# Patient Record
Sex: FEMALE | URBAN - METROPOLITAN AREA
[De-identification: names, ages, dates, MRNs, and addresses within clinical notes are randomized per-mention and may not be internally consistent; named-entity substitution may affect disease eponyms.]

---

## 2023-01-10 ENCOUNTER — TELEPHONE (OUTPATIENT)
Dept: ORTHOPEDIC SURGERY | Age: 63
End: 2023-01-10

## 2023-01-10 NOTE — TELEPHONE ENCOUNTER
Left vmail to r/s. Lindsay Heath is out. Please reschedule upon return call/may genia bill/Sushma for same day. Letter mailed.

## 2023-01-19 ENCOUNTER — OFFICE VISIT (OUTPATIENT)
Dept: ORTHOPEDIC SURGERY | Age: 63
End: 2023-01-19
Payer: MEDICAID

## 2023-01-19 VITALS — BODY MASS INDEX: 46.26 KG/M2 | WEIGHT: 245 LBS | HEIGHT: 61 IN

## 2023-01-19 DIAGNOSIS — M25.511 RIGHT SHOULDER PAIN, UNSPECIFIED CHRONICITY: Primary | ICD-10-CM

## 2023-01-19 DIAGNOSIS — M19.011 PRIMARY OSTEOARTHRITIS OF BOTH SHOULDERS: ICD-10-CM

## 2023-01-19 DIAGNOSIS — M25.512 LEFT SHOULDER PAIN, UNSPECIFIED CHRONICITY: ICD-10-CM

## 2023-01-19 DIAGNOSIS — M19.012 PRIMARY OSTEOARTHRITIS OF BOTH SHOULDERS: ICD-10-CM

## 2023-01-19 PROCEDURE — 99203 OFFICE O/P NEW LOW 30 MIN: CPT | Performed by: PHYSICIAN ASSISTANT

## 2023-01-19 RX ORDER — MELOXICAM 15 MG/1
15 TABLET ORAL DAILY
Qty: 30 TABLET | Refills: 3 | Status: SHIPPED | OUTPATIENT
Start: 2023-01-19 | End: 2023-02-18

## 2023-01-19 RX ORDER — TRAMADOL HYDROCHLORIDE 50 MG/1
50 TABLET ORAL EVERY 6 HOURS PRN
Qty: 28 TABLET | Refills: 0 | Status: SHIPPED | OUTPATIENT
Start: 2023-01-19 | End: 2023-01-26

## 2023-01-19 NOTE — PROGRESS NOTES
12 [facility]  History and Physical  Shoulder Pain    Date:  2023    Name:  Ha Santos  Address:  [id number]    :  1960      Age:   60 y.o.    SSN:  (Not on file)      Medical Record Number:  [id number]    Reason for Visit:    New Patient (Bilateral Shoulders)      HPI:   Ha Santos is a 60 y.o. female who presents to our office today complaining of  bilateral shoulder pain. She is right handed. She ports that her right shoulder pain became worse 6 months ago. She then began having left shoulder pain due to compensating for the right and now she is at a point where both her shoulders are bothering her equally. Was being treated at [facility] by Dr. Marie Later for surgical consultation. She has undergone corticosteroid injections for both the shoulders but got very little relief from them. Eventually she decided to schedule surgery starting with the right side for an anatomic shoulder replacement. She decided not to go through the surgery as she was not very happy with the care at that facility. Presents here for second opinion regarding a surgical intervention. Overall patient reports her pain levels are severe and rates it a 10/10 VAS. She is scared to move her shoulder in any direction because it can flareup sharp pains for her. She has difficulty sleeping on either of her shoulders and currently sleeping in a recliner to get some comfort. She is taking Motrin round-the-clock and Tylenol for pain control.     Pain Assessment  Location of Pain: Shoulder  Location Modifiers: Left, Right  Severity of Pain: 10  Quality of Pain: Sharp  Duration of Pain: Persistent  Frequency of Pain: Constant  Aggravating Factors:  (general use)  Limiting Behavior: Yes  Relieving Factors: Rest, Ice, Heat  Work-Related Injury: No  Are there other pain locations you wish to document?: No    Review of Systems:  A 14 point review of systems available in the scanned medical record as documented by the patient. The review is negative with the exception of those things mentioned in the History of Present Illness and Past Medical History. Past History:  Past Medical History:   Diagnosis Date    Asthma     High blood pressure      No past surgical history on file. No current outpatient medications on file prior to visit. No current facility-administered medications on file prior to visit. Social History     Socioeconomic History    Marital status: Not on file     Spouse name: Not on file    Number of children: Not on file    Years of education: Not on file    Highest education level: Not on file   Occupational History    Not on file   Tobacco Use    Smoking status: Not on file    Smokeless tobacco: Not on file   Substance and Sexual Activity    Alcohol use: Not on file    Drug use: Not on file    Sexual activity: Not on file   Other Topics Concern    Not on file   Social History Narrative    Not on file     Social Determinants of Health     Financial Resource Strain: Not on file   Food Insecurity: Not on file   Transportation Needs: Not on file   Physical Activity: Not on file   Stress: Not on file   Social Connections: Not on file   Intimate Partner Violence: Not on file   Housing Stability: Not on file     No family history on file. Current Medications:    Current Outpatient Medications   Medication Sig Dispense Refill    meloxicam (MOBIC) 15 MG tablet Take 1 tablet by mouth daily 30 tablet 3     No current facility-administered medications for this visit. Allergies: Allergies   Allergen Reactions    Codeine Nausea Only     GI upset         Physical Exam:  There were no vitals filed for this visit. General: Regla Rousseau is a healthy and well appearing 60 y.o. female who is sitting comfortably in our office in acute distress.      General Exam:   Constitutional: Patient is adequately groomed with no evidence of malnutrition  DTRs: Deep tendon reflexes are intact  Mental Status: The patient is oriented to time, place and person. The patient's mood and affect are appropriate. Lymphatic: The lymphatic examination bilaterally reveals all areas to be without enlargement or induration. Vascular: Examination reveals no swelling or calf tenderness. Peripheral pulses are palpable and 2+. Neurological: The patient has good coordination. There is no weakness or sensory deficit. Neuro: alert. Oriented X 3  Eyes: Extra-ocular muscles intact  Mouth: Oral mucosa moist. No perioral lesions  Pulm: Respirations unlabored and regular. right Shoulder Exam:  Inspection:  No gross deformities, no signs of infection. Palpation:  There is crepitus. Tenderness to palpation over the ac, rotator cuff, bicipital groove. Non-tender to palpation over the p. Active Range of Motion: Forward elevation of 100, abduction of 60, external rotation with elbow at the side 20, internal rotation to the back is back pocket    Passive Range of Motion: Passively forward elevation can be further increased to 110    Strength:   External rotation with resistance with elbow at the side +4/5, internal rotation with resistance with elbow at the side +4/5, Champagne toast testing +4/5    Special Tests:   No Kenny muscle deformity. Neurovascular: Sensation to light touch is intact, no motor deficits, palpable radial pulses 2+  Comparison left Shoulder Examination:    Inspection:   No gross deformities, no signs of infection. Palpation:  There is crepitus. Tenderness to palpation over the rotator cuff footprint and bicipital groove. Active Range of Motion: Forward elevation of 90, abduction of 50, external rotation with elbow at the side 20, internal rotation to the back is back pocket    Passive Range of Motion:  Passively forward elevation can be further increased to 120.     Strength:   External rotation with resistance with elbow at the side +4/5, internal rotation with resistance with elbow at the side +4/5, Champagne toast testing +4/5, Jobes test +4/5    Special Tests:   No Kenny muscle deformity. Neurovascular: Sensation to light touch is intact, no motor deficits, palpable radial pulses 2+    Laboratory:  No results found for any previous visit. No results found for this or any previous visit (from the past 24 hour(s)). Radiographic:  3 xray views of the bilateral  shoulder including True AP in internal and external and axillary lateral were taken in our office today reveals degenerative changes and severe narrowing within the glenohumeral joint. no fractures, dislocations, visible tumors, or signs of acute trauma. Self assessment questionnaires including ASES and Simple Shoulder Test were completed today. Assessment:  Sandi Chandler is a 60 y.o. female with bilateral shoulder pain related to primary osteoarthritis. Impression:  Encounter Diagnoses   Name Primary?     Right shoulder pain, unspecified chronicity Yes    Left shoulder pain, unspecified chronicity     Primary osteoarthritis of both shoulders        Office Procedures:  Orders Placed This Encounter   Procedures    XR SHOULDER RIGHT (MIN 2 VIEWS)     Standing Status:   Future     Number of Occurrences:   1     Standing Expiration Date:   1/19/2024     Order Specific Question:   Reason for exam:     Answer:   right shoulder pain    XR SHOULDER LEFT (MIN 2 VIEWS)     Standing Status:   Future     Number of Occurrences:   1     Standing Expiration Date:   1/19/2024     Order Specific Question:   Reason for exam:     Answer:   left shoulder pain    CT SHOULDER RIGHT WO CONTRAST     Standing Status:   Future     Standing Expiration Date:   1/19/2024     Order Specific Question:   Reason for exam:     Answer:   Priscilla Child [facility]    MRI SHOULDER RIGHT WO CONTRAST     Standing Status:   Future     Standing Expiration Date:   1/19/2024     Order Specific Question: Reason for exam:     Answer:   Rosie Martinez       Plan:  Pertinent imaging was reviewed. The etiology, natural history, and treatment options for the disorder were discussed. The roles of activity modifications, medications, cryotherapy and heat, injections, physical therapy, and surgical interventions were all described to the patient and questions were answered. Had a lengthy discussion with the patient today. Patient has tried conservative treatment for primary osteoarthritis and has not had a tremendous amount of relief from that. We feel that she is an excellent candidate for an anatomic total shoulder arthroplasty. We do recommend getting a CT scan with match point for the right shoulder to evaluate the bony deformity as well as an MRI to evaluate for rotator cuff dysfunction. The done primarily for planning purposes for her surgery. Once she has completed the studies we will have her meet with Dr. Ilsa Francisco or Tonio Persaud to discuss surgery in more detail. Susan Tyler will follow up in 1-2 weeks and/or as needed. They were in agreement with this plan and all questions were answered to the patient's satisfaction. They were encouraged to call with any questions. 1/19/2023  3:12 PM      Lieutenant Michael PA-C  Orthopaedic Sports Medicine Physician Assistant    During this examination, I, Lieutenant Michael PA-C, functioned as a scribe for Dr. Fiorella Gomez. This dictation was performed with a verbal recognition program (DRAGON) and it was checked for errors. It is possible that there are still dictated errors within this office note. If so, please bring any errors to my attention for an addendum. All efforts were made to ensure that this office note is accurate.

## 2023-02-02 ENCOUNTER — OFFICE VISIT (OUTPATIENT)
Dept: ORTHOPEDIC SURGERY | Age: 63
End: 2023-02-02

## 2023-02-02 VITALS — WEIGHT: 245 LBS | BODY MASS INDEX: 46.26 KG/M2 | HEIGHT: 61 IN

## 2023-02-02 DIAGNOSIS — M19.011 PRIMARY OSTEOARTHRITIS OF BOTH SHOULDERS: Primary | ICD-10-CM

## 2023-02-02 DIAGNOSIS — M19.012 PRIMARY OSTEOARTHRITIS OF BOTH SHOULDERS: Primary | ICD-10-CM

## 2023-02-02 DIAGNOSIS — M25.511 RIGHT SHOULDER PAIN, UNSPECIFIED CHRONICITY: ICD-10-CM

## 2023-02-02 RX ORDER — DIAZEPAM 5 MG/1
TABLET ORAL
COMMUNITY
Start: 2022-11-09

## 2023-02-02 RX ORDER — LISINOPRIL 20 MG/1
TABLET ORAL
COMMUNITY
Start: 2022-11-07

## 2023-02-02 RX ORDER — SIMVASTATIN 20 MG
TABLET ORAL
COMMUNITY
Start: 2022-11-07

## 2023-02-02 NOTE — PROGRESS NOTES
12 [facility]  History and Physical  Shoulder Pain    Date:  2023    Name:  Katelynn Kennedy  Address:  [address] [id number]    :  1960      Age:   60 y.o.    SSN:  [id number]      Medical Record Number:  [id number]    Reason for Visit:    Shoulder Pain (F/U RIGHT SHOULDER)      HPI:   Katelynn Kennedy is a 60 y.o. female who presents to our office today for follow up of the right shoulder pain. We previously diagnosed severe primary glenohumeral osteoarthritis of her right shoulder. She was asked to get further imaging to help with surgical planning including an MRI and a CT scan. Patient was able to get the MRI completed and presents today to review the results. Patient reports her symptoms have remained unchanged. She continues to have limited function, movement and pain with any activity. HPI 2023  She is right handed. She ports that her right shoulder pain became worse 6 months ago. She then began having left shoulder pain due to compensating for the right and now she is at a point where both her shoulders are bothering her equally. Was being treated at [facility] by Dr. Lura Saint for surgical consultation. She has undergone corticosteroid injections for both the shoulders but got very little relief from them. Eventually she decided to schedule surgery starting with the right side for an anatomic shoulder replacement. She decided not to go through the surgery as she was not very happy with the care at that facility. Presents here for second opinion regarding a surgical intervention. Overall patient reports her pain levels are severe and rates it a 10/10 VAS. She is scared to move her shoulder in any direction because it can flareup sharp pains for her. She has difficulty sleeping on either of her shoulders and currently sleeping in a recliner to get some comfort.   She is taking Motrin round-the-clock and Tylenol for pain control. Pain Assessment  Location of Pain: Shoulder  Location Modifiers: Right  Severity of Pain: 8  Quality of Pain: Throbbing  Duration of Pain: Persistent  Frequency of Pain: Constant  Aggravating Factors: Other (Comment), Exercise, Straightening, Stretching  Limiting Behavior: Yes  Relieving Factors: Rest, Ice, Heat, Nsaids  Work-Related Injury: No  Are there other pain locations you wish to document?: No    Review of Systems:  A 14 point review of systems available in the scanned medical record as documented by the patient and reviewed on 2/2/2023. The review is negative with the exception of those things mentioned in the History of Present Illness and Past Medical History. Past Medical History:  Patient's medications, allergies, past medical, surgical, social and family histories were reviewed and updated as appropriate. Allergies: Allergies   Allergen Reactions    Codeine Nausea Only     GI upset         Physical Exam:  There were no vitals filed for this visit. General: Bindu Churchill is a healthy and well appearing 60 y.o. female who is sitting comfortably in our office in acute distress. Skin:  There are no skin lesions, cellulitis, or extreme edema. The patient has warm and well-perfused Bilateral upper extremities with brisk capillary refill. Eyes: Extra-ocular muscles intact  Mouth: Oral mucosa moist. No perioral lesions  Pulm: Respirations unlabored and regular. Neuro: Alert and oriented x3    right Shoulder Exam:  Inspection:  No gross deformities, no signs of infection. Palpation:  There is crepitus. Tenderness to palpation over the [facility] joint, rotator cuff and bicipital.     Active Range of Motion: Forward elevation of 90, abduction of 80, external rotation with elbow at the side 20, internal rotation to the back is back pocket    Passive Range of Motion: Passively forward elevation can be further increased to 110 with pain.     Strength: External rotation with resistance with elbow at the side +4/5, internal rotation with resistance with elbow at the side +4/5, Champagne toast testing +4/5    Special Tests:   No Kenny muscle deformity. Neurovascular: Sensation to light touch is intact, no motor deficits, palpable radial pulses 2+    Additional Examinations:    Examination of the contralateral extremity does not show any tenderness, deformity or injury. Range of motion is unremarkable. There is no gross instability. There are no rashes, ulcerations or lesions. Strength and tone are normal.      Radiographic:  X-rays from from January 19 was reviewed in the office showing severe and advancing glenohumeral osteoarthritis    MRI right shoulder  CONCLUSION:   1. Severe glenohumeral arthropathy. Chronically torn and worn labrum. High grade    chondromalacia. Pseudocysts and marrow reaction throughout the humerus and glenoid. Complex    effusion. 2. Moderately tendinopathic supraspinatus tendon from critical zone to the insertion. Partial    thickness 8mm interstitial and articular sided tear slightly less than 50% of the tendon    thickness. Mild bursitis. 3. Chronically torn biceps long head tendon. 4. AC arthropathy. Assessment:  Paige Diop is a 60 y.o. female with bilateral shoulder pain related to primary osteoarthritis that is severe and advancing. .    Impression:  Encounter Diagnoses   Name Primary? Primary osteoarthritis of both shoulders Yes    Right shoulder pain, unspecified chronicity        Office Procedures:  No orders of the defined types were placed in this encounter. Plan:   Patient has severe osteoarthritis of both her shoulders. She would be an excellent candidate to for staged bilateral anatomic total shoulder arthroplasty, starting with the right side. We reviewed the MRI and she will need a CT scan for surgical planning.     Unfortunately after further evaluation into her chart it does appear that her insurance will not cover for her to have her surgery at [facility]  She was referred to an orthopedic in [place], [place]. She was asked to return back at any point should she have any questions or concerns. Duane Eden will follow up as needed. She was in agreement with this plan and all questions were answered to her satisfaction. She was encouraged to call with any questions. 2/2/2023  5:15 PM      Kade Lira PA-C  Orthopaedic Sports Medicine Physician Assistant    During this examination, Yanira Matt PA-C, functioned as a scribe for Dr. Deidra Driscoll. This dictation was performed with a verbal recognition program (DRAGON) and it was checked for errors. It is possible that there are still dictated errors within this office note. If so, please bring any errors to my attention for an addendum. All efforts were made to ensure that this office note is accurate.  ___________________  I, Dr. Deidra Driscoll, personally performed the services described in this documentation as described by Kade Lira PA-C in my presence, and it is both accurate and complete. Delmy Hidalgo MD, PhD  2/2/2023